# Patient Record
Sex: FEMALE | URBAN - METROPOLITAN AREA
[De-identification: names, ages, dates, MRNs, and addresses within clinical notes are randomized per-mention and may not be internally consistent; named-entity substitution may affect disease eponyms.]

---

## 2022-11-07 ENCOUNTER — OFFICE VISIT (OUTPATIENT)
Dept: URBAN - METROPOLITAN AREA CLINIC 16 | Facility: CLINIC | Age: 57
End: 2022-11-07
Payer: MEDICAID

## 2022-11-07 DIAGNOSIS — H43.811 VITREOUS DEGENERATION, RIGHT EYE: ICD-10-CM

## 2022-11-07 DIAGNOSIS — E11.9 DIABETES MELLITUS TYPE 2 WITHOUT MENTION OF COMPLICATION: Primary | ICD-10-CM

## 2022-11-07 PROCEDURE — 92004 COMPRE OPH EXAM NEW PT 1/>: CPT | Performed by: OPTOMETRIST

## 2022-11-07 ASSESSMENT — INTRAOCULAR PRESSURE
OS: 18
OD: 18

## 2022-11-07 NOTE — IMPRESSION/PLAN
Impression: Diabetes mellitus Type 2 without mention of complication: V20.1. Plan: No Non-Proliferative Diabetic Retinopathy, no Diabetic Macular Edema and no Neovascularization of the iris, disc, or elsewhere. OPTOS photos ordered and reviewed today, no sign of diabetic macular edema OU. Discussed ocular and systemic benefits of blood sugar control. Send notes to PCP. Check annually. RTC 1 year x CFM, OPTOS photos, OCT prn.

## 2022-11-07 NOTE — IMPRESSION/PLAN
Impression: Vitreous degeneration, right eye: H43.811. Plan: Posterior vitreous detachment accounts for symptoms. OPTOS photos ordered and reviewed. Discussed signs/symptoms of retinal detachment, RTC 1-3 month x follow up, w/OPTOS and or OCT, or sooner if vision changes.

## 2023-01-10 ENCOUNTER — OFFICE VISIT (OUTPATIENT)
Dept: URBAN - METROPOLITAN AREA CLINIC 16 | Facility: CLINIC | Age: 58
End: 2023-01-10
Payer: MEDICAID

## 2023-01-10 DIAGNOSIS — H43.811 VITREOUS DEGENERATION, RIGHT EYE: Primary | ICD-10-CM

## 2023-01-10 PROCEDURE — 92012 INTRM OPH EXAM EST PATIENT: CPT | Performed by: OPTOMETRIST

## 2023-01-10 ASSESSMENT — INTRAOCULAR PRESSURE
OD: 14
OS: 14

## 2023-01-10 NOTE — IMPRESSION/PLAN
Impression: Vitreous degeneration, right eye: H43.811. Plan: Posterior vitreous detachment accounts for symptoms. OPTOS photos ordered and reviewed. Discussed signs/symptoms of retinal detachment, RTC 6-12 months x follow up, w/OPTOS and or OCT, or sooner if vision changes.